# Patient Record
Sex: MALE | Race: WHITE | NOT HISPANIC OR LATINO | Employment: UNEMPLOYED | URBAN - METROPOLITAN AREA
[De-identification: names, ages, dates, MRNs, and addresses within clinical notes are randomized per-mention and may not be internally consistent; named-entity substitution may affect disease eponyms.]

---

## 2019-04-26 ENCOUNTER — OFFICE VISIT (OUTPATIENT)
Dept: NEUROLOGY | Facility: CLINIC | Age: 29
End: 2019-04-26

## 2019-04-26 VITALS
HEIGHT: 71 IN | SYSTOLIC BLOOD PRESSURE: 110 MMHG | HEART RATE: 96 BPM | DIASTOLIC BLOOD PRESSURE: 90 MMHG | BODY MASS INDEX: 33.17 KG/M2 | WEIGHT: 236.9 LBS | OXYGEN SATURATION: 98 %

## 2019-04-26 DIAGNOSIS — G40.909 NONINTRACTABLE EPILEPSY WITHOUT STATUS EPILEPTICUS, UNSPECIFIED EPILEPSY TYPE (HCC): ICD-10-CM

## 2019-04-26 DIAGNOSIS — G47.33 OBSTRUCTIVE SLEEP APNEA: ICD-10-CM

## 2019-04-26 DIAGNOSIS — G47.22 ADVANCED SLEEP PHASE SYNDROME: Primary | ICD-10-CM

## 2019-04-26 PROCEDURE — 99204 OFFICE O/P NEW MOD 45 MIN: CPT | Performed by: PSYCHIATRY & NEUROLOGY

## 2019-04-26 RX ORDER — OLANZAPINE 20 MG/1
20 TABLET ORAL NIGHTLY
COMMUNITY
End: 2021-09-29

## 2019-04-26 RX ORDER — LAMOTRIGINE 200 MG/1
200 TABLET ORAL DAILY
COMMUNITY
End: 2021-12-06 | Stop reason: SDUPTHER

## 2019-04-26 RX ORDER — VENLAFAXINE 75 MG/1
75 TABLET ORAL DAILY
COMMUNITY
End: 2021-12-30

## 2019-04-26 NOTE — PROGRESS NOTES
Subjective:     Patient ID: Davi Kendall is a 28 y.o. male.    Mr. Kendall is a 28 year old right handed male with a h/o epilepsy and MARGA (DDD, PDD and schizoaffective disorder per mom but wanted discretion with those diagnoses with patient) who presents to the neurology clinic today as a new patient today for the evaluation of his sleep disorder.  Reports that his circadian rhythm is abnormal.  This has been going on for 2 years.  Symptoms have gotten worse over the years.  Has been doing better recently with his MARGA treatment.  Going to bed around 5-6 pm.  Can fall asleep in a few minutes.  Gets up around 3-5 am.  Goal bedtime would be 9 pm and get up around 7 am.  Getting on phone helps delay bedtime.  Coffee doesn't help at all.  Does not have a light box.  But had tried in the past.  Tried melatonin in the past.  May have been 10 mg.  Doesn't remember how he took it.  Patient doesn't work.  Lives by self.  On disability.  Does not drive.  Mom takes care of finances.  Patient is not sure if he is his own POA.  Did not want mom back with him during the clinic visit, but it is ok to talk to her if she calls.  Takes olanzipine 20 mg qhs as a mood stabilizer around 5 pm, waits until he is sleepy before he takes it.  Takes all night time meds at 5 pm.  Feels somewhat refreshed in the morning.  No naps.  Before a few years ago, typical bedtime was 9-10 pm and would wake up around 7-8 am.  Drinks 4-5 cups of coffee a day.  Does get regular exercise.  No EtOH.  Started PAP the end of February.  Helped almost immediately.  Doesn't wake up during the night to urinate.  Sleeps through the night.  No more naps.  Has nasal pillows.  It is comfortable.  No major air leak.  Not sure if he is still snoring.  No bed partner.  Goes through [employer].  Compliance from Feb to March was >90%.  Residual AHI <5.  Pressures range between 6-7.5.  Is cleaning equipment regularly.  Sleep schedule is affecting social life.  Wants to go  to meet up groups in the evening. Is bored in the morning.  PSG from 2/18/19 showed an AHI of 18 (no positional component).  SE was 83%, TST was 7H 39M, AI was 28.  PLMI was 7.        Lyons sleepiness scale:   1. Sitting and reading? 2  2. Watching TV? 3  3. Sitting inactive in a public place? 0  4. As a passenger in a car for an hour without a break? 0  5. Lying down to rest in the afternoon when able? 2  6. Sitting and talking to someone? 0  7. Sitting quietly after lunch without EtOH? 1  8. In a car while stopped for a few minutes in traffic? (doesn't drive)  Total: 8    Last seizure was November of 2017.  Seizures started when he was about 15 years old.  Has convulsions.  Longest seizure was 6 minutes.  Generally just 2-3 minutes.  Has had 20-30 seizures in his life.  Would have a few a year.  This is the longest he has been seizure free.   mg BID.  Higher doses made him sleepy and had trouble walking.  Thinks that his current dose causes tremors. Tried VPA (caused weight gain).  Last EEG was a while ago, but has been told that he has generalized epilepsy.  No warnings before seizures.  Usually occur around 5-6 pm.  Triggers for seizures include strong stimulants (coffee and tobacco), stress, dehydration, sleep deprivation, flashing lights.      Risk factors:  Childhood/febrile seizures? no  Head trauma/pathology? no  CNS infections? no  Family history of seizures? No    I reviewed Dr. Hirsch's note from 12/13/18.  He was seen for hypersomnia and epilepsy.  A repeat PSG was ordered as well as an MSLT.  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        The following portions of the patient's history were reviewed and updated as appropriate: allergies, current medications, past family history, past medical history, past social history, past surgical history and problem list.    Review of Systems   Constitutional: Positive for fatigue and unexpected weight  change. Negative for appetite change.   HENT: Negative for ear pain, sinus pressure and trouble swallowing.    Eyes: Negative for pain, redness and visual disturbance.   Respiratory: Positive for apnea. Negative for shortness of breath and wheezing.    Cardiovascular: Negative for chest pain, palpitations and leg swelling.   Gastrointestinal: Negative for diarrhea, nausea and vomiting.   Endocrine: Negative for cold intolerance, heat intolerance and polyphagia.   Genitourinary: Negative for difficulty urinating, frequency and urgency.   Musculoskeletal: Negative for back pain, neck pain and neck stiffness.   Neurological: Positive for tremors and seizures. Negative for dizziness, syncope, facial asymmetry, speech difficulty, weakness, light-headedness, numbness and headaches.   Hematological: Does not bruise/bleed easily.   Psychiatric/Behavioral: Positive for decreased concentration. Negative for agitation, behavioral problems, confusion, dysphoric mood, hallucinations, self-injury, sleep disturbance and suicidal ideas. The patient is nervous/anxious. The patient is not hyperactive.     I reviewed the ROS documented by the MA.      Objective:    Neurologic Exam    Physical Exam   Constitutional:  Vital signs reviewed.  No apparent distress.  Well groomed.  Eyes:  No injection, no icterus.  Fundoscopic exam performed.  No papilledema appreciated bilaterally.   Respiratory:  Normal effort.  Clear to auscultation bilaterally.  Cardiovascular:  Regular rate and rhythm.  No murmurs.  No carotid bruits. Symmetric radial pulses.  Musculoskeletal: Normal station.  Gait steady.  Normal arm swing.  Patient able to walk on heels and toes.  Tandem gait intact.  Romberg negative.  Muscle tone and bulk normal.  Strength is 5/5 in the bilateral upper and lower extremities proximally and distally unless otherwise specified in the neurological exam.  Skin:  No rashes.  Warm, dry, and intact.  Psychiatric:  Good mood.  Normal  affect.    Neurologic:  Mental status-  The patient is alert and oriented to person, place and time (one day off on date). Attention/concentration is within normal limits.  Speech is fluent without dysarthria.  The patient is able to name, repeat and follow complex commands without difficulty.  Immediate memory and delayed recall intact (3/3 words immediate and after 4 minutes).  Fund of knowledge normal.  Cranial nerves- Pupils equally round and reactive to light with intact accomodation.  Visual acuity and visual fields intact.  Extraocular movements intact.  Facial sensation intact.  Smile symmetric.  Hearing intact to finger-rub bilaterally.  Palate elevates symmetrically.  SCM and trapezius are 5/5 bilaterally.  Tongue is midline.  Motor-  See musculoskeletal above.  No tremor.  Reflexes- 2+ in the bilateral triceps, biceps, brachioradialis, patellar and achilles.  Toes down-going bilaterally.  Sensation- Intact to pinprick and vibration in bilateral upper and lower extremities symmetrically.  Coordination- Intact to finger to nose and heel knee shin bilaterally.  Intact rapid alternating movements bilaterally.  Gait- See musculoskeletal exam above.     Assessment/Plan:  Mr. Kendall is a 28 year old male with a h/o epilepsy and moderate MARGA on autoPAP who presents today for the evaluation of advanced sleep phase.    1. Advanced sleep phase- Most likely diagnosis based on clinical presentation.  Will hold off on actigraphy for now.  Recommend light box therapy at night.  If using 7509-7030 lux, needs  minutes, if using 14535 lux, then 15-30 minutes.  Sit 18-24 inches away.  We also discussed the visors, but he was not particularly interested in those.  Will f/u in about a month.  If he is still having difficulty, may consider referral to Dr. Garcia for CBT.      2.  Epilepsy- May be structural based on his most recent MRI brain with focal onset seizures, but was told that he has primary generalized  epilepsy.  Is on a broad spectrum medication with LTG with no seizure and no side effects.  Recommend continuing current dose with no changes.  Consider PRN nasal midazolam as rescue in the future if seizures recur.      3.  Moderate MARGA on autoPAP- Continue with treatment.  Has noticed benefit.  Compliance looks great.  Weight loss long term may be beneficial.     Problems Addressed this Visit     None      Visit Diagnoses     Advanced sleep phase syndrome    -  Primary

## 2019-04-26 NOTE — PATIENT INSTRUCTIONS
Cleaning:    Masks and Nasal Pillows:  Wash once a day after use in the morning.  Use warm water and detergent with no lotion (Sahara, Maximo, etc) and rinse in warm water.  Allow to air dry.    Tubing:  Wash once a week with warm water and detergent with no lotion OR 1 part vinegar to 3 parts water.  Soak both inside and outside of the tubing and rinse in warm water.  Allow to air dry by hanging over shower zheng.    Headgear:  Can machine wash on gentle cycle with cold water or hand wash in mild detergent and rinse in cold water.  Allow to air dry.  Do not take apart (may be hard to get back together!)    Humidifier chamber:  Empty daily and add 1 cup of fresh distilled or boiled water.  Wash weekly in warm soapy water or vinegar solution and rinse in warm water.    Changing of supplies:    Every 2 weeks:  New nasal cushion or pillows; change filter    Monthly:  New full face cushion    Every 3 months:  New mask frame, new tubing    Every 6 months:  New headgear, new humidifier chamber, new chinstrap    At night, only use the bed and bedroom for sleep only. Do not read, watch TV, eat, or worry in bed.   Lie down only when you are sleepy.   If you are unable to fall asleep, get up and go to another room. Avoid stimulating activities if you do get up.   No clocks (if you tend to look at the clock throughout the night) or TV (or other electronic screens) in the bedroom.   Avoid screens (TV, computer, tablet, cell phone) the hour prior to bedtime and during your sleep period.   Establish a regular bedtime routine and a regular sleep/wake schedule. Keep this schedule 7 days a week.   Do not eat or drink close to bedtime.   Make sure your bedroom is dark, cool, and comfortable.   If you need background noise, use a fan or a white noise machine.   Avoid caffeine (regular coffee, decaf coffee, tea, sodas, chocolate, energy drinks).   Avoid alcohol and nicotine close to bedtime.   Exercise during the day, but not within 3 hours  of bedtime.   Avoid naps.   Check if any of your night time medications may cause sleep problems.   If you have heart burn or indigestion at night: avoid eating close to bedtime; elevated your head of bed; avoid spicy foods, tomatoes, citrus, alcohol, caffeine, and mint at night; take your antacid at night; sleep on your left side.   If you have nasal stuffiness or congestion: consider taking an over the counter allergy medicine such as zyrtec, claritin, or allegra at night as well as a nasal spray such as Flonase or Nasacort.     Try light therapy again.  If using 2,500-5,000 lux box, then sit 18-24 inches away for  minutes.  If using 10,000 lux box, only need to sit for 15-30 minutes.  If no benefit, then may consider cognitive behavioral therapy with Dr. Noel Garcia.

## 2019-06-14 ENCOUNTER — OFFICE VISIT (OUTPATIENT)
Dept: NEUROLOGY | Facility: CLINIC | Age: 29
End: 2019-06-14

## 2019-06-14 VITALS
HEART RATE: 85 BPM | WEIGHT: 241 LBS | HEIGHT: 71 IN | OXYGEN SATURATION: 99 % | DIASTOLIC BLOOD PRESSURE: 80 MMHG | SYSTOLIC BLOOD PRESSURE: 110 MMHG | BODY MASS INDEX: 33.74 KG/M2

## 2019-06-14 DIAGNOSIS — G47.22 ADVANCED SLEEP PHASE SYNDROME: Primary | ICD-10-CM

## 2019-06-14 PROCEDURE — 99214 OFFICE O/P EST MOD 30 MIN: CPT | Performed by: PSYCHIATRY & NEUROLOGY

## 2019-06-14 NOTE — PROGRESS NOTES
Subjective:     Patient ID: Davi Kendall is a 29 y.o. male.    Mr. Kendall is a 29 year old right handed male with a h/o epilepsy and MARGA (DDD, PDD, and schizoaffective disorder per mom) who presents to the neurology clinic today as an established patient for f/u.  He has preferred to conduct his clinic visits by himself; however, his mother has been available in the waiting room.  She was brought back for part of the visit which was ok'ed by the patient to help answer questions.  At his last visit, he was describing symptoms c/w advanced sleep phase.  He goes to bed around 5 pm and gets up around 3 am.  He would rather go to bed around 9 and get up at 7 so that he can socialize.  He has moderate MARGA on autoPAP which has improved his symptoms.  He's also had seizures since age 15, but his last seizure was Nov of 2017 on LTG monotherapy.  Since I last saw him, he states that his symptoms have been about the same.  He has been sitting in front of a light box for 20 minutes around 4 pm.  He stated that he was doing this every day for a few week; however mom thinks that he only tried it for a couple of days.  He does it in the kitchen and has not found any benefit.  If he is on his phone, he can prolong his bedtime to 7 pm.  He is still using his PAP every night and cleaning it regularly.  No further seizures since his last visit.        The following portions of the patient's history were reviewed and updated as appropriate: allergies, current medications, past family history, past medical history, past social history, past surgical history and problem list.    Review of Systems   Constitutional: Negative for activity change, appetite change and fatigue.   HENT: Negative for congestion, sinus pressure and trouble swallowing.    Eyes: Negative for pain, redness and visual disturbance.   Respiratory: Negative for chest tightness, shortness of breath and wheezing.    Cardiovascular: Negative for chest pain,  palpitations and leg swelling.   Gastrointestinal: Negative for diarrhea, nausea and vomiting.   Endocrine: Negative for cold intolerance, heat intolerance and polyphagia.   Genitourinary: Negative for difficulty urinating, frequency and urgency.   Musculoskeletal: Negative for back pain, gait problem and neck pain.   Neurological: Negative for dizziness, tremors, seizures, syncope, facial asymmetry, speech difficulty, weakness, light-headedness, numbness and headaches.   Hematological: Does not bruise/bleed easily.   Psychiatric/Behavioral: Negative for agitation, behavioral problems, confusion, decreased concentration, dysphoric mood, hallucinations, self-injury, sleep disturbance and suicidal ideas. The patient is not nervous/anxious and is not hyperactive.     I reviewed the ROS documented by the MA.      Objective:    Neurologic Exam    Physical Exam    Assessment/Plan:  Mr. Kendall is a 29 year old right handed male who presents today for f/u.    1.  Advanced sleep phase- Recommend trying to use the light later in the evening.  Will also get actigraphy to establish dx of advanced sleep phase.  If that is indeed what is going on, then may consider referral to Dr. Garcia for CBT.      2.  Epilepsy- Continue LTG at current dose with no changes.    3.  Moderate MARGA on autoPAP- Continue PAP.    A total of 25 minutes of face to face time was spent with the patient and >50% of that time was spent on counseling regarding their symptoms an plan of care.       Problems Addressed this Visit     None      Visit Diagnoses     Advanced sleep phase syndrome    -  Primary    Relevant Orders    Antigraphy Monitoring

## 2019-06-28 ENCOUNTER — DOCUMENTATION (OUTPATIENT)
Dept: SLEEP MEDICINE | Facility: HOSPITAL | Age: 29
End: 2019-06-28

## 2019-06-28 ENCOUNTER — HOSPITAL ENCOUNTER (OUTPATIENT)
Dept: SLEEP MEDICINE | Facility: HOSPITAL | Age: 29
Discharge: HOME | End: 2019-06-28
Admitting: PSYCHIATRY & NEUROLOGY

## 2019-06-28 DIAGNOSIS — G47.22 ADVANCED SLEEP PHASE SYNDROME: ICD-10-CM

## 2019-06-28 PROCEDURE — 95803 ACTIGRAPHY TESTING: CPT

## 2019-06-28 PROCEDURE — G0463 HOSPITAL OUTPT CLINIC VISIT: HCPCS

## 2019-06-28 NOTE — PROGRESS NOTES
Patient came into sleep center today alert and orient x3 to  Actiwatch Actigraphy device. Patient was educated on the care and use of the device. Patient was instructed to keep device on entire time until returned. Patient informed to only take device off if going to be in water longer then 30 minutes.  Patient was given the actiwatch wearer guide and shown how to fill out sleep documentation in the guide. Patient is to return the device on 7/15/19 no later then 1430.

## 2019-07-05 ENCOUNTER — DOCUMENTATION (OUTPATIENT)
Dept: SLEEP MEDICINE | Facility: HOSPITAL | Age: 29
End: 2019-07-05

## 2019-07-05 NOTE — PROGRESS NOTES
Patients mother came into sleep center today to return actigraphy device. Patients mother was not clear as to why her son would need to be wearing this device given his other issues he has. She was more concerned about having his meds looked and and maybe working with a psychologist. After speaking with patient and going over what could be done with the data from the Actigraphy watch patients mother felt more comfortable with trying to get he son to wear the device. Tech also informed given his tendencies to feel angst to not worry about feeling out the paper work given if would help. Patients mother took the watch back and stated she would talk her son into wearing it again. MAB

## 2019-10-14 ENCOUNTER — TELEPHONE (OUTPATIENT)
Dept: NEUROLOGY | Facility: CLINIC | Age: 29
End: 2019-10-14

## 2019-10-14 DIAGNOSIS — G47.22 ADVANCED SLEEP PHASE SYNDROME: Primary | ICD-10-CM

## 2019-10-14 NOTE — TELEPHONE ENCOUNTER
Patients mother called in and stated her son is a little hard to manage there fore he cancelled the sleep study and appointment via phone reminder and she was calling to have the sleep study order reentered so that patient could have that now, she also asked that we call her number going forward.

## 2019-10-23 ENCOUNTER — HOSPITAL ENCOUNTER (OUTPATIENT)
Dept: SLEEP MEDICINE | Facility: HOSPITAL | Age: 29
Discharge: HOME | End: 2019-10-23

## 2019-10-23 DIAGNOSIS — G47.22 ADVANCED SLEEP PHASE SYNDROME: ICD-10-CM

## 2020-05-29 ENCOUNTER — TELEPHONE (OUTPATIENT)
Dept: NEUROLOGY | Facility: CLINIC | Age: 30
End: 2020-05-29

## 2020-05-29 ENCOUNTER — OFFICE VISIT (OUTPATIENT)
Dept: NEUROLOGY | Facility: CLINIC | Age: 30
End: 2020-05-29

## 2020-05-29 VITALS
OXYGEN SATURATION: 97 % | DIASTOLIC BLOOD PRESSURE: 88 MMHG | BODY MASS INDEX: 35.14 KG/M2 | HEIGHT: 71 IN | HEART RATE: 111 BPM | WEIGHT: 251 LBS | SYSTOLIC BLOOD PRESSURE: 118 MMHG

## 2020-05-29 DIAGNOSIS — G40.909 NONINTRACTABLE EPILEPSY WITHOUT STATUS EPILEPTICUS, UNSPECIFIED EPILEPSY TYPE (HCC): Primary | ICD-10-CM

## 2020-05-29 DIAGNOSIS — G47.22 ADVANCED SLEEP PHASE SYNDROME: ICD-10-CM

## 2020-05-29 DIAGNOSIS — G47.33 OBSTRUCTIVE SLEEP APNEA: ICD-10-CM

## 2020-05-29 PROCEDURE — 99213 OFFICE O/P EST LOW 20 MIN: CPT | Performed by: PSYCHIATRY & NEUROLOGY

## 2020-05-29 NOTE — PROGRESS NOTES
Subjective:     Patient ID: Davi Kendall is a 30 y.o. male.    The patient is a 30-year-old right-handed male with a history of epilepsy and sleep apnea who presents to the neurology clinic today as an established patient for follow-up for seizures and sleep apnea.  The patient was last seen on June 14, 2019.  In the past, we had been concerned about advanced sleep phase.  We did do actigraphy that did not particularly support that.  In the past he would go to bed around 5 PM but if you would get on his phone he could prolong that bedtime to 7.  Then he would get up around 3 AM.  His goal would be to go to bed around 9 PM and get up around 7 AM.  The patient feels like his symptoms are worse.  He gets uncontrollably sleepy about 4 PM.  If he will get on his phone he can extend that to 6 and he will get up around 2 AM.  He feels refreshed and okay when he gets up and is not particularly sleepy during the daytime.  He is no longer using his light box.  He is using his CPAP with no issues.  He is not sure if he still snoring.  His weight at his last visit was 241 and today it is 251.  He uses nasal pillows.  There have not been any medication changes in his last visit.  He denies any seizures.  He continues on lamotrigine 200 mg twice a day.  He denies any side effects.  He takes it at 4 AM and 4 PM.  Higher doses made him sleepy.  The patient does not drive.  He does not nap.      The following portions of the patient's history were reviewed and updated as appropriate: allergies, current medications, past family history, past medical history, past social history, past surgical history and problem list.    Review of Systems     Objective:    Neurologic Exam    Physical Exam    Assessment/Plan:       Problems Addressed this Visit     None      Visit Diagnoses     Nonintractable epilepsy without status epilepticus, unspecified epilepsy type (CMS/HCC)    -  Primary    Obstructive sleep apnea        Advanced sleep phase  syndrome               The patient is a 30-year-old right-handed male with history of epilepsy and sleep apnea who presents today for follow-up.    1.  Epilepsy-Fortunately the patient remains seizure-free without side effects to his medication.  I recommend continuing on lamotrigine 200 mg twice a day with no changes.    2.  Sleep apnea-Overall doing well with no issues.  We will try to request a download from [employer].    3.  Advanced sleep phase-We will send referral to see Dr. Garcia.  The patient would like for appointments to be managed by his mother Georgia.  Her number is the main number listed in the demographics.    A total of 15 minutes of face-to-face time was spent with the patient and greater than 50% time was spent on counseling regarding his symptoms and plan of care.

## 2020-05-29 NOTE — PATIENT INSTRUCTIONS
[facility]  Pati Marie MD  Neurology clinic  [phone]    With anti-seizure medications, you may initially notice side effects of fatigue, drowsiness, unsteadiness, and dizziness.  Other possible side effects include nausea, abdominal pain, headache, blurry or double vision, slurred speech and mood changes.  Generally, patients will noticed these symptoms when the medication is first started or with higher doses and will go away with time.    It is import to consistently take your medication every day.  Missing just one dose may put you at risk for a breakthrough seizure.  Consider using reminders on your phone or a pill box.    If you develop a rash, please call the neurology clinic immediately or notify another healthcare professional, as this may be potentially life-threatening.  If you are unable to reach a healthcare professional, go to the emergency room immediately for further evaluation.    If you develop thoughts of wanting to hurt yourself or others, please call the neurology clinic immediately to notify another healthcare professional.  If you are unable to reach a healthcare professional, go to the emergency room immediately for further evaluation.    It is the [place] state law that you cannot drive within 90 days of a seizure.    You should avoid certain activities that if you were to have a seizure, you could harm yourself or others. In general, it is recommended that you avoid operating heavy machinery or power tools, swimming or taking baths by yourself (showers are ok), don't stand over open flames, don't get on high ladders or the roof.  I also recommend to avoid sleeping on your stomach.    For further information on epilepsy and resources available to patients and their families, please visit the [facility] at www.efky.org or call [phone].    **Check out the [facility]'s monthly Art Group Gathering.  They are  located at [facility], [address] [place] [address].  Call Tasia Correia at [phone] or email her at [email or web address] for the dates of future gatherings.**      **If you have having memory problems, consider HOBSCOTCH (Home-Based Self-management and Cognitive Training Changes lives).  It is an 8 week self-management program for adults with epilepsy and memory problems.  The program is free at the [facility].  Contact Taylor Garcia at [phone] or [email or web address].**              We will check with Dr. Ibarra if OK to take zyprexa and effexor together at night.  Will refer to Dr. Garcia for sleep problems.

## 2020-06-01 ENCOUNTER — TELEPHONE (OUTPATIENT)
Dept: NEUROLOGY | Facility: CLINIC | Age: 30
End: 2020-06-01

## 2020-06-01 ENCOUNTER — DOCUMENTATION (OUTPATIENT)
Dept: NEUROLOGY | Facility: CLINIC | Age: 30
End: 2020-06-01

## 2020-06-01 NOTE — TELEPHONE ENCOUNTER
----- Message from Pati Marie MD sent at 6/1/2020 10:39 AM EDT -----  Regarding: PCP f/u  Can you let this patient know that I spoke with his PCP and he was ok with taking both the olanzapine and venlafaxine at night to see if that may help with his sleep disorder?  Thanks!

## 2020-06-01 NOTE — PROGRESS NOTES
We received the patient's CPAP compliance download from May 2 to May 31, 2020.  His usage over 4 hours is 100%.  His mean pressure is 6.7, his peak average pressure is 11.5 and his pressure 90% of the time is 9.3.  His residual AHI is 1.6.  The patient is on AutoPap with a range of 5 to 12 cm of water.

## 2020-06-01 NOTE — TELEPHONE ENCOUNTER
I spoke with someone at [facility] and they was able to send a Email to the CPAP department to request the compliance report be faxed over.

## 2021-01-05 ENCOUNTER — TELEPHONE (OUTPATIENT)
Dept: INTERNAL MEDICINE | Facility: CLINIC | Age: 31
End: 2021-01-05

## 2021-01-05 NOTE — TELEPHONE ENCOUNTER
Patients mother is wanting to confirm with Dr. Ibarra that her son's BMI is 30 and that qualifies him to be in phase 1c of getting the covid vaccine.

## 2021-01-06 NOTE — TELEPHONE ENCOUNTER
I s/w the patient's mother, Georgia, and she requested I mail the letter to their house.  I set it up front to be mailed.

## 2021-04-16 ENCOUNTER — TELEPHONE (OUTPATIENT)
Dept: INTERNAL MEDICINE | Facility: CLINIC | Age: 31
End: 2021-04-16

## 2021-04-16 NOTE — TELEPHONE ENCOUNTER
" Please see below:  Just wanted to make you aware.    PATIENTS MOTHER CALLED IN STATING PATIENT HAS HAD AN EARACHE FOR 3 DAYS AND WANTED TO HAVE HIM SEEN TODAY. AFTER CONFIRMING  WITH OFFICE THAT THERE WERE NO APPOINTMENTS TODAY, I ADVISED HER THERE WAS NO AVAILABILITY AND SHE COULD HAVE HIM TREATED AT THE ER, WHILE IN THE PROCESS OF ATTEMPTING TO ASK IF I COULD GET HER TRANSFERRED TO THE OFFICE FOR THEM TO TELL HER SHE COULD GO TO AN URGENT CARE, SHE STATED \" SO YOU ALL DO NOT RESERVE APPOINTMENTS FOR PATIENTS WITH MEDICAL ISSUES?  I EXPLAINED WE DO OFFER SAME DAY APPOINTMENTS BUT THOSE DO NOT ALWAYS LAST LONG. SHE THEN STATED SO YOU ALL WANT ME TO GO TO SOME GERI IN THE BOX PLACE AND HUNG UP THE PHONE, SO I AM UNABLE TO CONFIRM IF SHE HAD THE PATIENT TREATED OR NOT.      "

## 2021-04-16 NOTE — TELEPHONE ENCOUNTER
"  Caller: SHIV ONEIL    Relationship to patient: Mother    Best call back number: [phone] (M)    Patient is needing: PATIENTS MOTHER CALLED IN STATING PATIENT HAS HAD AN EARACHE FOR 3 DAYS AND WANTED TO HAVE HIM SEEN TODAY. AFTER CONFIRMING  WITH OFFICE THAT THERE WERE NO APPOINTMENTS TODAY, I ADVISED HER THERE WAS NO AVAILABILITY AND SHE COULD HAVE HIM TREATED AT THE ER, WHILE IN THE PROCESS OF ATTEMPTING TO ASK IF I COULD GET HER TRANSFERRED TO THE OFFICE FOR THEM TO TELL HER SHE COULD GO TO AN URGENT CARE, SHE STATED \" SO YOU ALL DO NOT RESERVE APPOINTMENTS FOR PATIENTS WITH MEDICAL ISSUES?  I EXPLAINED WE DO OFFER SAME DAY APPOINTMENTS BUT THOSE DO NOT ALWAYS LAST LONG. SHE THEN STATED SO YOU ALL WANT ME TO GO TO SOME GERI IN THE BOX PLACE AND HUNG UP THE PHONE, SO I AM UNABLE TO CONFIRM IF SHE HAD THE PATIENT TREATED OR NOT.         "

## 2021-09-28 NOTE — TELEPHONE ENCOUNTER
Rx Refill Note  Requested Prescriptions     Pending Prescriptions Disp Refills   • venlafaxine XR (EFFEXOR-XR) 75 MG 24 hr capsule [Pharmacy Med Name: VENLAFAXINE ER 75MG CAPSULES] 90 capsule      Sig: TAKE ONE CAPSULE BY MOUTH EVERY MORNING   • OLANZapine (zyPREXA) 20 MG tablet [Pharmacy Med Name: OLANZAPINE 20MG TABLETS] 90 tablet      Sig: TAKE 1 TABLET BY MOUTH EVERY NIGHT AT BEDTIME      Last office visit with prescribing clinician: Visit date not found      Next office visit with prescribing clinician: Visit date not found            Michelle Brown MA  09/28/21, 10:43 EDT

## 2021-09-29 RX ORDER — OLANZAPINE 20 MG/1
TABLET ORAL
Qty: 90 TABLET | Refills: 2 | Status: SHIPPED | OUTPATIENT
Start: 2021-09-29 | End: 2021-12-30 | Stop reason: SDUPTHER

## 2021-09-29 RX ORDER — VENLAFAXINE HYDROCHLORIDE 75 MG/1
CAPSULE, EXTENDED RELEASE ORAL
Qty: 90 CAPSULE | Refills: 2 | Status: SHIPPED | OUTPATIENT
Start: 2021-09-29 | End: 2021-12-30

## 2021-09-30 ENCOUNTER — TELEPHONE (OUTPATIENT)
Dept: INTERNAL MEDICINE | Facility: CLINIC | Age: 31
End: 2021-09-30

## 2021-09-30 NOTE — TELEPHONE ENCOUNTER
Hub staff attempted to follow warm transfer process and was unsuccessful     Caller: SHIV ONEIL    Relationship to patient: Mother    Best call back number: [phone]    Patient is needing:NEEDING TO MAKE APPOINTMENT FOR RASH ON HAND   ANYTIME Monday WOULD WORK

## 2021-12-06 RX ORDER — LAMOTRIGINE 200 MG/1
200 TABLET ORAL DAILY
Qty: 30 TABLET | Refills: 1 | Status: SHIPPED | OUTPATIENT
Start: 2021-12-06 | End: 2021-12-07 | Stop reason: SDUPTHER

## 2021-12-06 NOTE — TELEPHONE ENCOUNTER
PATIENT'S MOTHER IS CALLING BACK TO STATE [facility] TOLD HER THAT THEY HAVE ALREADY REQUESTED THE REFILL OF THE MEDICATION ON 12/02/21.  SHE STATES HE HAS ONLY 5 DAYS OF MEDICATION REMAINING.  SHE ALSO STATES IT NEEDS TO BE BRAND NAME ONLY     lamoTRIgine (LaMICtal) 200 MG tablet     [id number] - [place], [place] - [facility] AT [place] [address] - [phone]  - [phone]   [phone]

## 2021-12-06 NOTE — TELEPHONE ENCOUNTER
Caller: SHIV ONEIL    Relationship: Mother    Best call back number: [phone]    Requested Prescriptions:   Requested Prescriptions     Pending Prescriptions Disp Refills   • lamoTRIgine (LaMICtal) 200 MG tablet       Sig: Take 1 tablet by mouth Daily.        Pharmacy where request should be sent: [id number] - [place], [place] - [facility] AT [place] [address] - [phone]  - [phone] FX     Additional details provided by patient: MEDS NEED TO BE THE NAME BRAND.     PLEASE ADVISE IF PATIENT NEEDS AN APPOINTMENT TO GET THIS REFILLED, PATIENT HAS FIVE DAYS LEFT.     Does the patient have less than a 3 day supply:  [] Yes  [x] No    Ammon Hernandez Rep   12/06/21 09:11 EST

## 2021-12-07 RX ORDER — LAMOTRIGINE 200 MG/1
200 TABLET ORAL DAILY
Qty: 30 TABLET | Refills: 1 | Status: SHIPPED | OUTPATIENT
Start: 2021-12-07 | End: 2021-12-30

## 2021-12-07 RX ORDER — LAMOTRIGINE 200 MG/1
200 TABLET ORAL DAILY
Qty: 30 TABLET | Refills: 1 | Status: CANCELLED | OUTPATIENT
Start: 2021-12-07

## 2021-12-07 NOTE — TELEPHONE ENCOUNTER
Mom called asking if we can send the lamictal again and add dispense as written so insurance will cover

## 2021-12-07 NOTE — TELEPHONE ENCOUNTER
Caller: THAD [place]    Relationship:     Best call back number: [phone] PLEASE ADVISE URBAN    Requested Prescriptions:   Requested Prescriptions     Pending Prescriptions Disp Refills   • lamoTRIgine (LaMICtal) 200 MG tablet 30 tablet 1     Sig: Take 1 tablet by mouth Daily.     Signed Prescriptions Disp Refills   • lamoTRIgine (LaMICtal) 200 MG tablet 30 tablet 1     Sig: Take 1 tablet by mouth Daily.     Authorizing Provider: [facility]        Pharmacy where request should be sent: [facility] - [place], [place] - [address] AT [address] TO REGISTERED [facility] [address] - [phone]  - [phone]      Additional details provided by patient: PRIOR AUTH NEEDED FOR NEW YEAR TERM  ORIGINAL  2021.  PATIENT HAS 3 DAYS LEFT OF MEDICATIONS.    PATIENTS MOTHER STATED THIS NEEDS TO BE SENT ELECTRONICALLY AND MARKED URGENT SO TURN AROUND WILL BE QUICK.    PATIENT HAS BEEN TRYING TO GET THIS DONE FOR A FOUR DAY PERIOD AND REALLY NEEDS THIS DONE TODAY.    Does the patient have less than a 3 day supply:  [x] Yes  [] No    Ammon Archibald Rep   21 14:15 EST

## 2021-12-07 NOTE — TELEPHONE ENCOUNTER
Looks like he really has not been seen in a long time.  He no showed in October.  Probably needs to be seen I would think

## 2021-12-30 ENCOUNTER — OFFICE VISIT (OUTPATIENT)
Dept: INTERNAL MEDICINE | Facility: CLINIC | Age: 31
End: 2021-12-30

## 2021-12-30 VITALS
SYSTOLIC BLOOD PRESSURE: 134 MMHG | BODY MASS INDEX: 35.49 KG/M2 | DIASTOLIC BLOOD PRESSURE: 88 MMHG | HEART RATE: 89 BPM | WEIGHT: 262 LBS | RESPIRATION RATE: 16 BRPM | OXYGEN SATURATION: 97 % | TEMPERATURE: 97.7 F | HEIGHT: 72 IN

## 2021-12-30 DIAGNOSIS — R73.9 HYPERGLYCEMIA: ICD-10-CM

## 2021-12-30 DIAGNOSIS — E66.8 OTHER OBESITY: ICD-10-CM

## 2021-12-30 DIAGNOSIS — G40.909 SEIZURE DISORDER (HCC): Chronic | ICD-10-CM

## 2021-12-30 DIAGNOSIS — F41.9 ANXIETY AND DEPRESSION: Primary | ICD-10-CM

## 2021-12-30 DIAGNOSIS — F32.A ANXIETY AND DEPRESSION: Primary | ICD-10-CM

## 2021-12-30 PROCEDURE — 99214 OFFICE O/P EST MOD 30 MIN: CPT | Performed by: INTERNAL MEDICINE

## 2021-12-30 RX ORDER — LAMOTRIGINE 200 MG/1
200 TABLET ORAL 2 TIMES DAILY
Qty: 180 TABLET | Refills: 1 | Status: SHIPPED | OUTPATIENT
Start: 2021-12-30 | End: 2022-07-08 | Stop reason: SDUPTHER

## 2021-12-30 RX ORDER — VENLAFAXINE HYDROCHLORIDE 75 MG/1
75 CAPSULE, EXTENDED RELEASE ORAL EVERY MORNING
Qty: 90 CAPSULE | Refills: 1 | Status: SHIPPED | OUTPATIENT
Start: 2021-12-30 | End: 2022-08-16 | Stop reason: SDUPTHER

## 2021-12-30 RX ORDER — OLANZAPINE 20 MG/1
20 TABLET ORAL
Qty: 90 TABLET | Refills: 1 | Status: SHIPPED | OUTPATIENT
Start: 2021-12-30 | End: 2022-08-16 | Stop reason: SDUPTHER

## 2021-12-30 NOTE — ASSESSMENT & PLAN NOTE
CONTROLLED  - cont on current medications with SNRI + atypical antipsychotic--> refills sent today  - Reviewed potential side effects of medication including sexual performance changes, insomnia, fatigue, weight changes, metabolic changes including HLD and DM. Pt tolerating well without any issues.   - will get labs today for drug monitoring as it has been several years

## 2021-12-30 NOTE — ASSESSMENT & PLAN NOTE
CONTROLLED  - previously seen by neurology, now managed at PCP  - last seizure was 2017, stable on meds  - monitoring labs on lamictal ordered today--> Refills sent

## 2021-12-30 NOTE — PROGRESS NOTES
"Chief Complaint  Follow-up, Med Refill, Anxiety, and Depression    Subjective          Davi Kendall presents to [facility] for follow up and med refills. Pt taking all medications daily as prescribed with good reported compliance. No issues or side effects with meds.   Doing well on all meds from a mood standpoint, no need for dose titration.   Last seizure was 2017, stable on meds.       Objective   Vital Signs:     /88   Pulse 89   Temp 97.7 °F (36.5 °C)   Resp 16   Ht 182.9 cm (72\")   Wt 119 kg (262 lb)   SpO2 97%   BMI 35.53 kg/m²     Physical Exam  Vitals and nursing note reviewed.   Constitutional:       General: He is not in acute distress.     Appearance: Normal appearance.   Cardiovascular:      Rate and Rhythm: Normal rate and regular rhythm.      Pulses: Normal pulses.      Heart sounds: Normal heart sounds. No murmur heard.      Pulmonary:      Effort: Pulmonary effort is normal. No respiratory distress.      Breath sounds: Normal breath sounds.   Abdominal:      General: Abdomen is flat. Bowel sounds are normal.      Palpations: Abdomen is soft.      Tenderness: There is no abdominal tenderness.   Musculoskeletal:      Right lower leg: No edema.      Left lower leg: No edema.   Neurological:      Mental Status: He is alert and oriented to person, place, and time. Mental status is at baseline.   Psychiatric:         Mood and Affect: Mood normal.         Behavior: Behavior normal.          Result Review :: None        Assessment and Plan      Diagnoses and all orders for this visit:    1. Anxiety and depression (Primary)  Assessment & Plan:  CONTROLLED  - cont on current medications with SNRI + atypical antipsychotic--> refills sent today  - Reviewed potential side effects of medication including sexual performance changes, insomnia, fatigue, weight changes, metabolic changes including HLD and DM. Pt tolerating well without any issues.   - " will get labs today for drug monitoring as it has been several years    Orders:  -     LaMICtal 200 MG tablet; Take 1 tablet by mouth 2 (Two) Times a Day. Do not substitute  Dispense: 180 tablet; Refill: 1  -     OLANZapine (zyPREXA) 20 MG tablet; Take 1 tablet by mouth every night at bedtime.  Dispense: 90 tablet; Refill: 1  -     venlafaxine XR (EFFEXOR-XR) 75 MG 24 hr capsule; Take 1 capsule by mouth Every Morning.  Dispense: 90 capsule; Refill: 1  -     Comprehensive Metabolic Panel  -     CBC & Differential  -     Lipid Panel  -     Hemoglobin A1c    2. Seizure disorder (HCC)  Assessment & Plan:  CONTROLLED  - previously seen by neurology, now managed at PCP  - last seizure was 2017, stable on meds  - monitoring labs on lamictal ordered today--> Refills sent    Orders:  -     Comprehensive Metabolic Panel  -     CBC & Differential    Follow Up   Return in about 6 months (around 6/30/2022) for Annual physical.    Patient was given instructions and counseling regarding his condition or for health maintenance advice. Please see specific information pulled into the AVS if appropriate.     Catrina Chang MD  [facility]  Phone: [phone]  Fax: [phone]

## 2021-12-31 LAB
ALBUMIN SERPL-MCNC: 5.1 G/DL (ref 4–5)
ALBUMIN/GLOB SERPL: 1.9 {RATIO} (ref 1.2–2.2)
ALP SERPL-CCNC: 76 IU/L (ref 44–121)
ALT SERPL-CCNC: 68 IU/L (ref 0–44)
AST SERPL-CCNC: 36 IU/L (ref 0–40)
BASOPHILS # BLD AUTO: 0 X10E3/UL (ref 0–0.2)
BASOPHILS NFR BLD AUTO: 0 %
BILIRUB SERPL-MCNC: 0.4 MG/DL (ref 0–1.2)
BUN SERPL-MCNC: 10 MG/DL (ref 6–20)
BUN/CREAT SERPL: 10 (ref 9–20)
CALCIUM SERPL-MCNC: 9.4 MG/DL (ref 8.7–10.2)
CHLORIDE SERPL-SCNC: 103 MMOL/L (ref 96–106)
CHOLEST SERPL-MCNC: 208 MG/DL (ref 100–199)
CO2 SERPL-SCNC: 18 MMOL/L (ref 20–29)
CREAT SERPL-MCNC: 1.03 MG/DL (ref 0.76–1.27)
EOSINOPHIL # BLD AUTO: 0 X10E3/UL (ref 0–0.4)
EOSINOPHIL NFR BLD AUTO: 0 %
ERYTHROCYTE [DISTWIDTH] IN BLOOD BY AUTOMATED COUNT: 11.9 % (ref 11.6–15.4)
GLOBULIN SER CALC-MCNC: 2.7 G/DL (ref 1.5–4.5)
GLUCOSE SERPL-MCNC: 80 MG/DL (ref 65–99)
HBA1C MFR BLD: 5.2 % (ref 4.8–5.6)
HCT VFR BLD AUTO: 48.1 % (ref 37.5–51)
HDLC SERPL-MCNC: 35 MG/DL
HGB BLD-MCNC: 16.5 G/DL (ref 13–17.7)
IMM GRANULOCYTES # BLD AUTO: 0 X10E3/UL (ref 0–0.1)
IMM GRANULOCYTES NFR BLD AUTO: 0 %
LDLC SERPL CALC-MCNC: 128 MG/DL (ref 0–99)
LYMPHOCYTES # BLD AUTO: 2.1 X10E3/UL (ref 0.7–3.1)
LYMPHOCYTES NFR BLD AUTO: 31 %
MCH RBC QN AUTO: 30.8 PG (ref 26.6–33)
MCHC RBC AUTO-ENTMCNC: 34.3 G/DL (ref 31.5–35.7)
MCV RBC AUTO: 90 FL (ref 79–97)
MONOCYTES # BLD AUTO: 0.7 X10E3/UL (ref 0.1–0.9)
MONOCYTES NFR BLD AUTO: 11 %
NEUTROPHILS # BLD AUTO: 4 X10E3/UL (ref 1.4–7)
NEUTROPHILS NFR BLD AUTO: 58 %
PLATELET # BLD AUTO: 254 X10E3/UL (ref 150–450)
POTASSIUM SERPL-SCNC: 4.2 MMOL/L (ref 3.5–5.2)
PROT SERPL-MCNC: 7.8 G/DL (ref 6–8.5)
RBC # BLD AUTO: 5.35 X10E6/UL (ref 4.14–5.8)
SODIUM SERPL-SCNC: 138 MMOL/L (ref 134–144)
TRIGL SERPL-MCNC: 253 MG/DL (ref 0–149)
VLDLC SERPL CALC-MCNC: 45 MG/DL (ref 5–40)
WBC # BLD AUTO: 6.8 X10E3/UL (ref 3.4–10.8)

## 2022-07-08 DIAGNOSIS — F41.9 ANXIETY AND DEPRESSION: ICD-10-CM

## 2022-07-08 DIAGNOSIS — F32.A ANXIETY AND DEPRESSION: ICD-10-CM

## 2022-07-08 RX ORDER — LAMOTRIGINE 200 MG/1
200 TABLET ORAL 2 TIMES DAILY
Qty: 180 TABLET | Refills: 1 | Status: SHIPPED | OUTPATIENT
Start: 2022-07-08 | End: 2022-08-16 | Stop reason: SDUPTHER

## 2022-07-08 NOTE — TELEPHONE ENCOUNTER
MOTHER STATES THIS RX IS URGENT   PATIENT ONLY HAS TWO TABS LEFT   PLEASE APPROVE ASAP   THANK YOU  Rx Refill Note  Requested Prescriptions     Pending Prescriptions Disp Refills   • LaMICtal 200 MG tablet 180 tablet 1     Sig: Take 1 tablet by mouth 2 (Two) Times a Day. Do not substitute      Last office visit with prescribing clinician: Visit date not found      Next office visit with prescribing clinician: Visit date not found            Quincy Jauregui MA  07/08/22, 09:42 EDT

## 2022-08-16 ENCOUNTER — OFFICE VISIT (OUTPATIENT)
Dept: INTERNAL MEDICINE | Facility: CLINIC | Age: 32
End: 2022-08-16

## 2022-08-16 VITALS
DIASTOLIC BLOOD PRESSURE: 78 MMHG | HEART RATE: 80 BPM | TEMPERATURE: 98.2 F | BODY MASS INDEX: 37.38 KG/M2 | WEIGHT: 276 LBS | OXYGEN SATURATION: 99 % | HEIGHT: 72 IN | RESPIRATION RATE: 18 BRPM | SYSTOLIC BLOOD PRESSURE: 130 MMHG

## 2022-08-16 DIAGNOSIS — Z11.4 ENCOUNTER FOR SCREENING FOR HIV: ICD-10-CM

## 2022-08-16 DIAGNOSIS — G40.909 SEIZURE DISORDER: ICD-10-CM

## 2022-08-16 DIAGNOSIS — E78.2 MIXED HYPERLIPIDEMIA: ICD-10-CM

## 2022-08-16 DIAGNOSIS — R73.9 HYPERGLYCEMIA: ICD-10-CM

## 2022-08-16 DIAGNOSIS — Z13.0 SCREENING FOR DEFICIENCY ANEMIA: ICD-10-CM

## 2022-08-16 DIAGNOSIS — F41.9 ANXIETY AND DEPRESSION: ICD-10-CM

## 2022-08-16 DIAGNOSIS — Z11.59 ENCOUNTER FOR HCV SCREENING TEST FOR LOW RISK PATIENT: ICD-10-CM

## 2022-08-16 DIAGNOSIS — Z00.00 WELL ADULT EXAM: Primary | ICD-10-CM

## 2022-08-16 DIAGNOSIS — F32.A ANXIETY AND DEPRESSION: ICD-10-CM

## 2022-08-16 DIAGNOSIS — R73.01 ELEVATED FASTING GLUCOSE: ICD-10-CM

## 2022-08-16 PROCEDURE — G0439 PPPS, SUBSEQ VISIT: HCPCS | Performed by: INTERNAL MEDICINE

## 2022-08-16 PROCEDURE — 1170F FXNL STATUS ASSESSED: CPT | Performed by: INTERNAL MEDICINE

## 2022-08-16 PROCEDURE — 1160F RVW MEDS BY RX/DR IN RCRD: CPT | Performed by: INTERNAL MEDICINE

## 2022-08-16 RX ORDER — LAMOTRIGINE 200 MG/1
200 TABLET ORAL 2 TIMES DAILY
Qty: 180 TABLET | Refills: 2 | Status: SHIPPED | OUTPATIENT
Start: 2022-08-16 | End: 2022-12-15 | Stop reason: SDUPTHER

## 2022-08-16 RX ORDER — OLANZAPINE 20 MG/1
20 TABLET ORAL
Qty: 90 TABLET | Refills: 2 | Status: SHIPPED | OUTPATIENT
Start: 2022-08-16 | End: 2022-12-19

## 2022-08-16 RX ORDER — VENLAFAXINE HYDROCHLORIDE 75 MG/1
75 CAPSULE, EXTENDED RELEASE ORAL EVERY MORNING
Qty: 90 CAPSULE | Refills: 1 | Status: CANCELLED | OUTPATIENT
Start: 2022-08-16

## 2022-08-16 RX ORDER — VENLAFAXINE HYDROCHLORIDE 75 MG/1
75 CAPSULE, EXTENDED RELEASE ORAL EVERY MORNING
Qty: 90 CAPSULE | Refills: 2 | Status: SHIPPED | OUTPATIENT
Start: 2022-08-16 | End: 2023-02-07

## 2022-08-16 NOTE — PROGRESS NOTES
The ABCs of the Annual Wellness Visit  Subsequent Medicare Wellness Visit    Chief Complaint   Patient presents with   • Annual Exam      Subjective    History of Present Illness:  Davi Kendall is a 32 y.o. male who presents for a Subsequent Medicare Wellness Visit.    The following portions of the patient's history were reviewed and   updated as appropriate: allergies, current medications, past family history, past medical history, past social history, past surgical history and problem list.    Compared to one year ago, the patient feels his physical   health is the same.    Compared to one year ago, the patient feels his mental   health is the same.    Recent Hospitalizations:  He was not admitted to the hospital during the last year.       Current Medical Providers:  Patient Care Team:  Girish Ibarra MD as PCP - General (Internal Medicine)    Outpatient Medications Prior to Visit   Medication Sig Dispense Refill   • fluticasone (FLONASE) 50 MCG/ACT nasal spray 2 sprays into the nostril(s) as directed by provider Daily. 16 g 0   • LaMICtal 200 MG tablet Take 1 tablet by mouth 2 (Two) Times a Day. Do not substitute 180 tablet 1   • OLANZapine (zyPREXA) 20 MG tablet Take 1 tablet by mouth every night at bedtime. 90 tablet 1   • venlafaxine XR (EFFEXOR-XR) 75 MG 24 hr capsule Take 1 capsule by mouth Every Morning. 90 capsule 1   • cefdinir (OMNICEF) 300 MG capsule Take 1 capsule by mouth 2 (Two) Times a Day. 14 capsule 0     No facility-administered medications prior to visit.       No opioid medication identified on active medication list. I have reviewed chart for other potential  high risk medication/s and harmful drug interactions in the elderly.          Aspirin is not on active medication list.  Aspirin use is not indicated based on review of current medical condition/s. Risk of harm outweighs potential benefits.  .    Patient Active Problem List   Diagnosis   • Anxiety and depression   • Seizure  "disorder (HCC)     Advance Care Planning  Advance Directive is on file.  ACP discussion was held with the patient during this visit. Patient has an advance directive in EMR which is still valid.           Objective    Vitals:    08/16/22 1124   BP: 130/78   Pulse: 80   Resp: 18   Temp: 98.2 °F (36.8 °C)   SpO2: 99%   Weight: 125 kg (276 lb)   Height: 182.9 cm (72\")     Estimated body mass index is 37.43 kg/m² as calculated from the following:    Height as of this encounter: 182.9 cm (72\").    Weight as of this encounter: 125 kg (276 lb).    Class 2 Severe Obesity (BMI >=35 and <=39.9). Obesity-related health conditions include the following: none. Obesity is worsening. BMI is is above average; BMI management plan is completed. We discussed portion control and increasing exercise.      Does the patient have evidence of cognitive impairment? No    Physical Exam            HEALTH RISK ASSESSMENT    Smoking Status:  Social History     Tobacco Use   Smoking Status Former Smoker   Smokeless Tobacco Never Used     Alcohol Consumption:  Social History     Substance and Sexual Activity   Alcohol Use No     Fall Risk Screen:    STEADI Fall Risk Assessment has not been completed.    Depression Screening:  No flowsheet data found.    Health Habits and Functional and Cognitive Screening:  No flowsheet data found.    Age-appropriate Screening Schedule:  Refer to the list below for future screening recommendations based on patient's age, sex and/or medical conditions. Orders for these recommended tests are listed in the plan section. The patient has been provided with a written plan.    Health Maintenance   Topic Date Due   • INFLUENZA VACCINE  10/01/2022   • LIPID PANEL  12/30/2022   • TDAP/TD VACCINES (2 - Td or Tdap) 07/15/2023              Assessment & Plan   CMS Preventative Services Quick Reference  Risk Factors Identified During Encounter  Obesity/Overweight   Polypharmacy  The above risks/problems have been discussed with " the patient.  Follow up actions/plans if indicated are seen below in the Assessment/Plan Section.  Pertinent information has been shared with the patient in the After Visit Summary.    Diagnoses and all orders for this visit:    1. Well adult exam (Primary)  -     Comprehensive Metabolic Panel  -     Cancel: Lipid Panel  -     Cancel: Hemoglobin A1c  -     Cancel: CBC & Differential    2. Encounter for screening for HIV  -     HIV-1 / O / 2 Ag / Antibody 4th Generation    3. Encounter for HCV screening test for low risk patient  -     Hepatitis C Antibody    4. Anxiety and depression  -     OLANZapine (zyPREXA) 20 MG tablet; Take 1 tablet by mouth every night at bedtime.  Dispense: 90 tablet; Refill: 2  -     LaMICtal 200 MG tablet; Take 1 tablet by mouth 2 (Two) Times a Day. Do not substitute  Dispense: 180 tablet; Refill: 2  -     venlafaxine XR (EFFEXOR-XR) 75 MG 24 hr capsule; Take 1 capsule by mouth Every Morning.  Dispense: 90 capsule; Refill: 2    5. Elevated fasting glucose  -     Hemoglobin A1c    6. Screening for deficiency anemia  -     CBC w AUTO Differential    7. Mixed hyperlipidemia  -     Lipid panel    8. Seizure disorder (HCC)    9. Hyperglycemia        Follow Up:   No follow-ups on file.     An After Visit Summary and PPPS were made available to the patient.

## 2022-08-16 NOTE — TELEPHONE ENCOUNTER
Rx Refill Note  Requested Prescriptions     Pending Prescriptions Disp Refills   • venlafaxine XR (EFFEXOR-XR) 75 MG 24 hr capsule 90 capsule 1     Sig: Take 1 capsule by mouth Every Morning.      Last office visit with prescribing clinician: 8/16/2022      Next office visit with prescribing clinician: Visit date not found            Quincy Jauregui MA  08/16/22, 11:26 EDT

## 2022-08-16 NOTE — PROGRESS NOTES
"Chief Complaint   Patient presents with   • Annual Exam       Subjective   Davi Kendall is a 32 y.o. male.     History of Present Illness     The following portions of the patient's history were reviewed and updated as appropriate: allergies, current medications, past family history, past medical history, past social history, past surgical history, and problem list.    Review of Systems      Objective   Body mass index is 37.43 kg/m².   Vitals:    08/16/22 1124   BP: 130/78   Pulse: 80   Resp: 18   Temp: 98.2 °F (36.8 °C)   SpO2: 99%   Weight: 125 kg (276 lb)   Height: 182.9 cm (72\")        Physical Exam  Vitals and nursing note reviewed.   Constitutional:       General: He is not in acute distress.     Appearance: Normal appearance.   HENT:      Head: Normocephalic and atraumatic.      Right Ear: External ear normal.      Left Ear: External ear normal.      Nose: Nose normal.      Mouth/Throat:      Mouth: Mucous membranes are moist.      Pharynx: Oropharynx is clear.   Eyes:      Extraocular Movements: Extraocular movements intact.      Conjunctiva/sclera: Conjunctivae normal.      Pupils: Pupils are equal, round, and reactive to light.   Cardiovascular:      Rate and Rhythm: Normal rate and regular rhythm.      Pulses: Normal pulses.      Heart sounds: Normal heart sounds. No murmur heard.    No gallop.   Pulmonary:      Effort: Pulmonary effort is normal.      Breath sounds: Normal breath sounds.   Abdominal:      General: Abdomen is flat. Bowel sounds are normal. There is no distension.      Palpations: Abdomen is soft. There is no mass.      Tenderness: There is no abdominal tenderness.   Musculoskeletal:         General: No swelling. Normal range of motion.      Cervical back: Normal range of motion and neck supple.   Skin:     General: Skin is warm and dry.      Findings: No rash.   Neurological:      General: No focal deficit present.      Mental Status: He is alert and oriented to person, place, and " time. Mental status is at baseline.   Psychiatric:         Mood and Affect: Mood normal.         Behavior: Behavior normal.           Current Outpatient Medications:   •  fluticasone (FLONASE) 50 MCG/ACT nasal spray, 2 sprays into the nostril(s) as directed by provider Daily., Disp: 16 g, Rfl: 0  •  LaMICtal 200 MG tablet, Take 1 tablet by mouth 2 (Two) Times a Day. Do not substitute, Disp: 180 tablet, Rfl: 2  •  OLANZapine (zyPREXA) 20 MG tablet, Take 1 tablet by mouth every night at bedtime., Disp: 90 tablet, Rfl: 2  •  venlafaxine XR (EFFEXOR-XR) 75 MG 24 hr capsule, Take 1 capsule by mouth Every Morning., Disp: 90 capsule, Rfl: 2     Lab Results   Component Value Date    HGBA1C 5.2 12/30/2021        Health Maintenance   Topic Date Due   • INFLUENZA VACCINE  10/01/2022   • LIPID PANEL  12/30/2022   • TDAP/TD VACCINES (2 - Td or Tdap) 07/15/2023        Immunization History   Administered Date(s) Administered   • COVID-19 (PFIZER) PURPLE CAP 02/27/2021, 03/20/2021, 09/04/2021       Assessment & Plan   Diagnoses and all orders for this visit:    1. Well adult exam (Primary)  -     Comprehensive Metabolic Panel  -     Cancel: Lipid Panel  -     Cancel: Hemoglobin A1c  -     Cancel: CBC & Differential    2. Encounter for screening for HIV  -     HIV-1 / O / 2 Ag / Antibody 4th Generation    3. Encounter for HCV screening test for low risk patient  -     Hepatitis C Antibody    4. Anxiety and depression  -     OLANZapine (zyPREXA) 20 MG tablet; Take 1 tablet by mouth every night at bedtime.  Dispense: 90 tablet; Refill: 2  -     LaMICtal 200 MG tablet; Take 1 tablet by mouth 2 (Two) Times a Day. Do not substitute  Dispense: 180 tablet; Refill: 2  -     venlafaxine XR (EFFEXOR-XR) 75 MG 24 hr capsule; Take 1 capsule by mouth Every Morning.  Dispense: 90 capsule; Refill: 2    5. Elevated fasting glucose  -     Hemoglobin A1c    6. Screening for deficiency anemia  -     CBC w AUTO Differential    7. Mixed  hyperlipidemia  -     Lipid panel    8. Seizure disorder (HCC)    9. Hyperglycemia    - conitnue lamictal, olanzapine and venlafaxine, doing well without issues or side effects; keep neurology appts, check labs       Well adult exam  - labs checked and evaluated    Colonoscopy - at 45  Prostate - at 50  Glaucoma - yearly  AAA - na  Lung cancer - na  HIV - checking  HCV - checking  DM - checking  HLD - checking  Smoking - no  Depression - yes, well managed with meds  Vaccines - up to date  Falls - no  Alcohol Screening - no    Discussed mental health, sexual health, substance use, abuse, anticipatory guidance given.      No follow-ups on file.     Girish Ibarra MD  [facility] [place]  Internal Medicine and Pediatrics  Phone: [phone]  Fax: [phone]

## 2022-08-17 LAB
ALBUMIN SERPL-MCNC: 4.9 G/DL (ref 4–5)
ALBUMIN/GLOB SERPL: 1.8 {RATIO} (ref 1.2–2.2)
ALP SERPL-CCNC: 68 IU/L (ref 44–121)
ALT SERPL-CCNC: 71 IU/L (ref 0–44)
AST SERPL-CCNC: 40 IU/L (ref 0–40)
BASOPHILS # BLD AUTO: 0 X10E3/UL (ref 0–0.2)
BASOPHILS NFR BLD AUTO: 0 %
BILIRUB SERPL-MCNC: 0.4 MG/DL (ref 0–1.2)
BUN SERPL-MCNC: 9 MG/DL (ref 6–20)
BUN/CREAT SERPL: 9 (ref 9–20)
CALCIUM SERPL-MCNC: 9.4 MG/DL (ref 8.7–10.2)
CHLORIDE SERPL-SCNC: 105 MMOL/L (ref 96–106)
CHOLEST SERPL-MCNC: 197 MG/DL (ref 100–199)
CO2 SERPL-SCNC: 18 MMOL/L (ref 20–29)
CREAT SERPL-MCNC: 0.96 MG/DL (ref 0.76–1.27)
EGFRCR-CYS SERPLBLD CKD-EPI 2021: 108 ML/MIN/1.73
EOSINOPHIL # BLD AUTO: 0 X10E3/UL (ref 0–0.4)
EOSINOPHIL NFR BLD AUTO: 0 %
ERYTHROCYTE [DISTWIDTH] IN BLOOD BY AUTOMATED COUNT: 12.4 % (ref 11.6–15.4)
GLOBULIN SER CALC-MCNC: 2.7 G/DL (ref 1.5–4.5)
GLUCOSE SERPL-MCNC: 92 MG/DL (ref 65–99)
HBA1C MFR BLD: 5.3 % (ref 4.8–5.6)
HCT VFR BLD AUTO: 46.5 % (ref 37.5–51)
HCV AB S/CO SERPL IA: 0.1 S/CO RATIO (ref 0–0.9)
HDLC SERPL-MCNC: 34 MG/DL
HGB BLD-MCNC: 15.9 G/DL (ref 13–17.7)
HIV 1+2 AB+HIV1 P24 AG SERPL QL IA: NON REACTIVE
IMM GRANULOCYTES # BLD AUTO: 0 X10E3/UL (ref 0–0.1)
IMM GRANULOCYTES NFR BLD AUTO: 0 %
LDLC SERPL CALC-MCNC: 115 MG/DL (ref 0–99)
LYMPHOCYTES # BLD AUTO: 1.8 X10E3/UL (ref 0.7–3.1)
LYMPHOCYTES NFR BLD AUTO: 27 %
MCH RBC QN AUTO: 31.6 PG (ref 26.6–33)
MCHC RBC AUTO-ENTMCNC: 34.2 G/DL (ref 31.5–35.7)
MCV RBC AUTO: 92 FL (ref 79–97)
MONOCYTES # BLD AUTO: 0.7 X10E3/UL (ref 0.1–0.9)
MONOCYTES NFR BLD AUTO: 11 %
NEUTROPHILS # BLD AUTO: 4.1 X10E3/UL (ref 1.4–7)
NEUTROPHILS NFR BLD AUTO: 62 %
PLATELET # BLD AUTO: 245 X10E3/UL (ref 150–450)
POTASSIUM SERPL-SCNC: 4.2 MMOL/L (ref 3.5–5.2)
PROT SERPL-MCNC: 7.6 G/DL (ref 6–8.5)
RBC # BLD AUTO: 5.03 X10E6/UL (ref 4.14–5.8)
SODIUM SERPL-SCNC: 141 MMOL/L (ref 134–144)
TRIGL SERPL-MCNC: 276 MG/DL (ref 0–149)
VLDLC SERPL CALC-MCNC: 48 MG/DL (ref 5–40)
WBC # BLD AUTO: 6.7 X10E3/UL (ref 3.4–10.8)

## 2022-12-15 DIAGNOSIS — F32.A ANXIETY AND DEPRESSION: ICD-10-CM

## 2022-12-15 DIAGNOSIS — F41.9 ANXIETY AND DEPRESSION: ICD-10-CM

## 2022-12-15 RX ORDER — LAMOTRIGINE 200 MG/1
200 TABLET ORAL 2 TIMES DAILY
Qty: 180 TABLET | Refills: 2 | Status: SHIPPED | OUTPATIENT
Start: 2022-12-15

## 2022-12-15 NOTE — TELEPHONE ENCOUNTER
Caller: THAD, [place]    Relationship: Mother    Best call back number: [phone]    Requested Prescriptions:   Requested Prescriptions     Pending Prescriptions Disp Refills   • LaMICtal 200 MG tablet 180 tablet 2     Sig: Take 1 tablet by mouth 2 (Two) Times a Day. Do not substitute        Pharmacy where request should be sent: [facility][id number] [address] AT [place] [address] - [phone]  - [phone] FX     Additional details provided by patient:     Does the patient have less than a 3 day supply:  [] Yes  [x] No    Would you like a call back once the refill request has been completed: [x] Yes [] No    If the office needs to give you a call back, can they leave a voicemail: [x] Yes [] No    Ammon Blackburn Rep   12/15/22 08:13 EST

## 2022-12-15 NOTE — TELEPHONE ENCOUNTER
Rx Refill Note  Requested Prescriptions     Pending Prescriptions Disp Refills   • LaMICtal 200 MG tablet 180 tablet 2     Sig: Take 1 tablet by mouth 2 (Two) Times a Day. Do not substitute      Last office visit with prescribing clinician: 8/16/2022   Last telemedicine visit with prescribing clinician: 2/16/2023   Next office visit with prescribing clinician: 2/16/2023                         Would you like a call back once the refill request has been completed: [] Yes [] No    If the office needs to give you a call back, can they leave a voicemail: [] Yes [] No    Quincy Jauregui MA  12/15/22, 08:40 EST

## 2022-12-16 ENCOUNTER — TELEPHONE (OUTPATIENT)
Dept: INTERNAL MEDICINE | Facility: CLINIC | Age: 32
End: 2022-12-16

## 2022-12-16 NOTE — TELEPHONE ENCOUNTER
Caller: SHIV ONEIL    Relationship: Mother    Best call back number: [phone]    What was the call regarding: PATIENT'S MOTHER STATED THAT SHE IS HAVING TROUBLE GETTING INTO PATIENT'S MYCHART. PATIENT'S MOTHER REQUESTED A REFILL YESTERDAY OF PATIENT'S LaMICtal 200 MG tablet.  PATIENT'S MOTHER IS CALLING TO SEE IF THAT HAS BEEN APPROVED OR NOT. PLEASE ADVISE.    Do you require a callback: YES

## 2022-12-17 DIAGNOSIS — F32.A ANXIETY AND DEPRESSION: ICD-10-CM

## 2022-12-17 DIAGNOSIS — F41.9 ANXIETY AND DEPRESSION: ICD-10-CM

## 2022-12-19 ENCOUNTER — TELEPHONE (OUTPATIENT)
Dept: INTERNAL MEDICINE | Facility: CLINIC | Age: 32
End: 2022-12-19

## 2022-12-19 RX ORDER — OLANZAPINE 20 MG/1
20 TABLET ORAL
Qty: 90 TABLET | Refills: 2 | Status: SHIPPED | OUTPATIENT
Start: 2022-12-19

## 2022-12-19 NOTE — TELEPHONE ENCOUNTER
Rx Refill Note  Requested Prescriptions     Pending Prescriptions Disp Refills   • OLANZapine (zyPREXA) 20 MG tablet [Pharmacy Med Name: OLANZAPINE 20MG TABLETS] 90 tablet 2     Sig: TAKE 1 TABLET BY MOUTH EVERY NIGHT AT BEDTIME      Last office visit with prescribing clinician: 8/16/2022   Last telemedicine visit with prescribing clinician: 12/19/2022   Next office visit with prescribing clinician: 12/19/2022                         Would you like a call back once the refill request has been completed: [] Yes [] No    If the office needs to give you a call back, can they leave a voicemail: [] Yes [] No    Quincy Jauregui MA  12/19/22, 13:06 EST

## 2023-02-07 DIAGNOSIS — F41.9 ANXIETY AND DEPRESSION: ICD-10-CM

## 2023-02-07 DIAGNOSIS — F32.A ANXIETY AND DEPRESSION: ICD-10-CM

## 2023-02-07 RX ORDER — VENLAFAXINE HYDROCHLORIDE 75 MG/1
75 CAPSULE, EXTENDED RELEASE ORAL EVERY MORNING
Qty: 90 CAPSULE | Refills: 2 | Status: SHIPPED | OUTPATIENT
Start: 2023-02-07

## 2023-02-07 NOTE — TELEPHONE ENCOUNTER
Rx Refill Note  Requested Prescriptions     Pending Prescriptions Disp Refills   • venlafaxine XR (EFFEXOR-XR) 75 MG 24 hr capsule [Pharmacy Med Name: VENLAFAXINE ER 75MG CAPSULES] 90 capsule 2     Sig: TAKE 1 CAPSULE BY MOUTH EVERY MORNING      Last office visit with prescribing clinician: 8/16/2022   Last telemedicine visit with prescribing clinician: 2/16/2023   Next office visit with prescribing clinician: 2/16/2023                         Would you like a call back once the refill request has been completed: [] Yes [] No    If the office needs to give you a call back, can they leave a voicemail: [] Yes [] No    Quincy Jauregui MA  02/07/23, 08:47 EST

## 2023-02-16 ENCOUNTER — OFFICE VISIT (OUTPATIENT)
Dept: INTERNAL MEDICINE | Facility: CLINIC | Age: 33
End: 2023-02-16
Payer: MEDICARE

## 2023-02-16 VITALS
SYSTOLIC BLOOD PRESSURE: 128 MMHG | HEIGHT: 72 IN | BODY MASS INDEX: 37.11 KG/M2 | TEMPERATURE: 98.4 F | RESPIRATION RATE: 18 BRPM | OXYGEN SATURATION: 98 % | DIASTOLIC BLOOD PRESSURE: 74 MMHG | HEART RATE: 64 BPM | WEIGHT: 274 LBS

## 2023-02-16 DIAGNOSIS — R73.9 HYPERGLYCEMIA: ICD-10-CM

## 2023-02-16 DIAGNOSIS — E78.2 MIXED HYPERLIPIDEMIA: Primary | ICD-10-CM

## 2023-02-16 DIAGNOSIS — R74.01 TRANSAMINITIS: ICD-10-CM

## 2023-02-16 PROCEDURE — 99214 OFFICE O/P EST MOD 30 MIN: CPT | Performed by: INTERNAL MEDICINE

## 2023-02-16 NOTE — PROGRESS NOTES
"Chief Complaint  Anxiety (Follow up )    Subjective        Davi Kendall presents to [facility]  History of Present Illness  Here for follow up of seizure disorder. Has been adherent to medication regimen. Last seizure was in 2017, sees Neuro annually.       Objective   Vital Signs:  /74   Pulse 64   Temp 98.4 °F (36.9 °C)   Resp 18   Ht 182.9 cm (72\")   Wt 124 kg (274 lb)   SpO2 98%   BMI 37.16 kg/m²   Estimated body mass index is 37.16 kg/m² as calculated from the following:    Height as of this encounter: 182.9 cm (72\").    Weight as of this encounter: 124 kg (274 lb).           Physical Exam  Vitals and nursing note reviewed.   Constitutional:       General: He is not in acute distress.     Appearance: Normal appearance. He is obese.   HENT:      Head: Normocephalic and atraumatic.      Right Ear: External ear normal.      Left Ear: External ear normal.      Nose: Nose normal.      Mouth/Throat:      Mouth: Mucous membranes are moist.      Pharynx: Oropharynx is clear.   Eyes:      Extraocular Movements: Extraocular movements intact.      Conjunctiva/sclera: Conjunctivae normal.      Pupils: Pupils are equal, round, and reactive to light.   Cardiovascular:      Rate and Rhythm: Normal rate and regular rhythm.      Pulses: Normal pulses.      Heart sounds: Normal heart sounds. No murmur heard.    No gallop.   Pulmonary:      Effort: Pulmonary effort is normal.      Breath sounds: Normal breath sounds.   Abdominal:      General: Abdomen is flat. Bowel sounds are normal. There is no distension.      Palpations: Abdomen is soft. There is no mass.      Tenderness: There is no abdominal tenderness.   Musculoskeletal:         General: No swelling. Normal range of motion.      Cervical back: Normal range of motion and neck supple.   Skin:     General: Skin is warm and dry.      Findings: No rash.   Neurological:      General: No focal deficit present.      " Mental Status: He is alert and oriented to person, place, and time. Mental status is at baseline.   Psychiatric:         Mood and Affect: Mood normal.         Behavior: Behavior normal.        Result Review :  The following data was reviewed by: Lionel Guzman, Medical Student on 02/16/2023:  Common labs    Common Labs 8/16/22 8/16/22 8/16/22 8/16/22    1155 1158 1158 1158   Glucose 92      BUN 9      Creatinine 0.96      Sodium 141      Potassium 4.2      Chloride 105      Calcium 9.4      Total Protein 7.6      Albumin 4.9      Total Bilirubin 0.4      Alkaline Phosphatase 68      AST (SGOT) 40      ALT (SGPT) 71 (A)      WBC    6.7   Hemoglobin    15.9   Hematocrit    46.5   Platelets    245   Total Cholesterol   197    Triglycerides   276 (A)    HDL Cholesterol   34 (A)    LDL Cholesterol    115 (A)    Hemoglobin A1C  5.3     (A) Abnormal value       Comments are available for some flowsheets but are not being displayed.                        Assessment and Plan   Diagnoses and all orders for this visit:    1. Mixed hyperlipidemia (Primary)  -     Comprehensive Metabolic Panel  -     Lipid Panel With / Chol / HDL Ratio  -     Hemoglobin A1c    2. Hyperglycemia  -     Comprehensive Metabolic Panel  -     Lipid Panel With / Chol / HDL Ratio  -     Hemoglobin A1c    - Repeat CBC, CMP, Lipid panel, A1c today       Follow Up   No follow-ups on file.  Patient was given instructions and counseling regarding his condition or for health maintenance advice. Please see specific information pulled into the AVS if appropriate.

## 2023-02-17 LAB
ALBUMIN SERPL-MCNC: 5.2 G/DL (ref 3.5–5.2)
ALBUMIN/GLOB SERPL: 1.9 G/DL
ALP SERPL-CCNC: 72 U/L (ref 39–117)
ALT SERPL-CCNC: 67 U/L (ref 1–41)
AST SERPL-CCNC: 32 U/L (ref 1–40)
BILIRUB SERPL-MCNC: 0.5 MG/DL (ref 0–1.2)
BUN SERPL-MCNC: 9 MG/DL (ref 6–20)
BUN/CREAT SERPL: 9.1 (ref 7–25)
CALCIUM SERPL-MCNC: 9.5 MG/DL (ref 8.6–10.5)
CHLORIDE SERPL-SCNC: 104 MMOL/L (ref 98–107)
CHOLEST SERPL-MCNC: 215 MG/DL (ref 0–200)
CHOLEST/HDLC SERPL: 5.51 {RATIO}
CO2 SERPL-SCNC: 24.1 MMOL/L (ref 22–29)
CREAT SERPL-MCNC: 0.99 MG/DL (ref 0.76–1.27)
EGFRCR SERPLBLD CKD-EPI 2021: 103.8 ML/MIN/1.73
GLOBULIN SER CALC-MCNC: 2.7 GM/DL
GLUCOSE SERPL-MCNC: 80 MG/DL (ref 65–99)
HBA1C MFR BLD: 5 % (ref 4.8–5.6)
HDLC SERPL-MCNC: 39 MG/DL (ref 40–60)
LDLC SERPL CALC-MCNC: 130 MG/DL (ref 0–100)
POTASSIUM SERPL-SCNC: 4.3 MMOL/L (ref 3.5–5.2)
PROT SERPL-MCNC: 7.9 G/DL (ref 6–8.5)
SODIUM SERPL-SCNC: 139 MMOL/L (ref 136–145)
TRIGL SERPL-MCNC: 259 MG/DL (ref 0–150)
VLDLC SERPL CALC-MCNC: 46 MG/DL (ref 5–40)

## 2023-03-10 ENCOUNTER — TELEPHONE (OUTPATIENT)
Dept: INTERNAL MEDICINE | Facility: CLINIC | Age: 33
End: 2023-03-10

## 2023-03-10 NOTE — TELEPHONE ENCOUNTER
Caller: SHIV ONEIL    Relationship: Mother    Best call back number: [phone]    Caller requesting test results: MOTHER      What test was performed: LABS    When was the test performed: 2/16/23    Where was the test performed: IN OFFICE     Additional notes: PATIENT'S MOTHER STATES THAT AN ULTRASOUND WAS ORDERED BASED OFF OF RESULTS FROM RECENT LABS. SHE IS REQUESTING A CALLBACK TO DISCUSS THE NEED TO THAT ULTRASOUND. PLEASE ADVISE.

## 2023-05-22 DIAGNOSIS — F32.A ANXIETY AND DEPRESSION: ICD-10-CM

## 2023-05-22 DIAGNOSIS — F41.9 ANXIETY AND DEPRESSION: ICD-10-CM

## 2023-05-22 RX ORDER — VENLAFAXINE HYDROCHLORIDE 75 MG/1
75 CAPSULE, EXTENDED RELEASE ORAL EVERY MORNING
Qty: 90 CAPSULE | Refills: 2 | Status: SHIPPED | OUTPATIENT
Start: 2023-05-22

## 2023-05-22 NOTE — TELEPHONE ENCOUNTER
Caller: SHIV ONEIL    Relationship: Mother    Best call back number: [phone]    Requested Prescriptions:   Requested Prescriptions     Pending Prescriptions Disp Refills   • venlafaxine XR (EFFEXOR-XR) 75 MG 24 hr capsule 90 capsule 2     Sig: Take 1 capsule by mouth Every Morning.        Pharmacy where request should be sent: [id number] [address] AT [facility] [address] - [phone]  - [phone]     Last office visit with prescribing clinician: 2/16/2023   Last telemedicine visit with prescribing clinician: 3/10/2023   Next office visit with prescribing clinician: Visit date not found     Additional details provided by patient: PATIENT HAS 1 TABLET LEFT OF THIS MEDICATION.     Does the patient have less than a 3 day supply:  [x] Yes  [] No    Would you like a call back once the refill request has been completed: [x] Yes [] No    If the office needs to give you a call back, can they leave a voicemail: [x] Yes [] No    Ammon Mac Rep   05/22/23 08:17 EDT

## 2023-05-22 NOTE — TELEPHONE ENCOUNTER
Rx Refill Note  Requested Prescriptions     Pending Prescriptions Disp Refills   • venlafaxine XR (EFFEXOR-XR) 75 MG 24 hr capsule 90 capsule 2     Sig: Take 1 capsule by mouth Every Morning.      Last office visit with prescribing clinician: 2/16/2023   Last telemedicine visit with prescribing clinician: 3/10/2023   Next office visit with prescribing clinician: Visit date not found                         Would you like a call back once the refill request has been completed: [] Yes [] No    If the office needs to give you a call back, can they leave a voicemail: [] Yes [] No    Nadira Pennington MA  05/22/23, 09:18 EDT

## 2023-09-29 ENCOUNTER — OFFICE VISIT (OUTPATIENT)
Dept: INTERNAL MEDICINE | Facility: CLINIC | Age: 33
End: 2023-09-29
Payer: MEDICARE

## 2023-09-29 VITALS
HEIGHT: 72 IN | DIASTOLIC BLOOD PRESSURE: 82 MMHG | HEART RATE: 82 BPM | TEMPERATURE: 98 F | OXYGEN SATURATION: 97 % | BODY MASS INDEX: 37.08 KG/M2 | RESPIRATION RATE: 18 BRPM | WEIGHT: 273.8 LBS | SYSTOLIC BLOOD PRESSURE: 124 MMHG

## 2023-09-29 DIAGNOSIS — F41.9 ANXIETY AND DEPRESSION: ICD-10-CM

## 2023-09-29 DIAGNOSIS — Z00.00 WELL ADULT EXAM: Primary | ICD-10-CM

## 2023-09-29 DIAGNOSIS — F32.A ANXIETY AND DEPRESSION: ICD-10-CM

## 2023-09-29 DIAGNOSIS — R73.01 ELEVATED FASTING GLUCOSE: ICD-10-CM

## 2023-09-29 DIAGNOSIS — E78.2 MIXED HYPERLIPIDEMIA: ICD-10-CM

## 2023-09-29 DIAGNOSIS — G40.909 SEIZURE DISORDER: ICD-10-CM

## 2023-09-29 PROCEDURE — G0439 PPPS, SUBSEQ VISIT: HCPCS | Performed by: INTERNAL MEDICINE

## 2023-09-29 RX ORDER — OLANZAPINE 20 MG/1
20 TABLET ORAL
Qty: 90 TABLET | Refills: 2 | Status: SHIPPED | OUTPATIENT
Start: 2023-09-29 | End: 2023-10-01 | Stop reason: SDUPTHER

## 2023-09-29 RX ORDER — VENLAFAXINE HYDROCHLORIDE 75 MG/1
75 CAPSULE, EXTENDED RELEASE ORAL EVERY MORNING
Qty: 90 CAPSULE | Refills: 2 | Status: SHIPPED | OUTPATIENT
Start: 2023-09-29 | End: 2023-10-01 | Stop reason: SDUPTHER

## 2023-09-29 RX ORDER — LAMOTRIGINE 200 MG/1
200 TABLET ORAL 2 TIMES DAILY
Qty: 180 TABLET | Refills: 2 | Status: SHIPPED | OUTPATIENT
Start: 2023-09-29 | End: 2023-10-01 | Stop reason: SDUPTHER

## 2023-09-29 NOTE — TELEPHONE ENCOUNTER
Rx Refill Note  Requested Prescriptions     Pending Prescriptions Disp Refills    LaMICtal 200 MG tablet 180 tablet 2     Sig: Take 1 tablet by mouth 2 (Two) Times a Day. Do not substitute    OLANZapine (zyPREXA) 20 MG tablet 90 tablet 2     Sig: Take 1 tablet by mouth every night at bedtime.    venlafaxine XR (EFFEXOR-XR) 75 MG 24 hr capsule 90 capsule 2     Sig: Take 1 capsule by mouth Every Morning.      Last office visit with prescribing clinician: 9/29/2023   Last telemedicine visit with prescribing clinician: Visit date not found   Next office visit with prescribing clinician: Visit date not found                         Would you like a call back once the refill request has been completed: [] Yes [] No    If the office needs to give you a call back, can they leave a voicemail: [] Yes [] No    Quincy Jauregui MA  09/29/23, 11:28 EDT

## 2023-09-30 LAB
ALBUMIN SERPL-MCNC: 5.1 G/DL (ref 3.5–5.2)
ALBUMIN/GLOB SERPL: 2 G/DL
ALP SERPL-CCNC: 71 U/L (ref 39–117)
ALT SERPL-CCNC: 72 U/L (ref 1–41)
AST SERPL-CCNC: 40 U/L (ref 1–40)
BASOPHILS # BLD AUTO: 0 10*3/MM3 (ref 0–0.2)
BASOPHILS NFR BLD AUTO: 0 % (ref 0–1.5)
BILIRUB SERPL-MCNC: 0.6 MG/DL (ref 0–1.2)
BUN SERPL-MCNC: 12 MG/DL (ref 6–20)
BUN/CREAT SERPL: 12.2 (ref 7–25)
CALCIUM SERPL-MCNC: 9.6 MG/DL (ref 8.6–10.5)
CHLORIDE SERPL-SCNC: 103 MMOL/L (ref 98–107)
CHOLEST SERPL-MCNC: 210 MG/DL (ref 0–200)
CHOLEST/HDLC SERPL: 5.83 {RATIO}
CO2 SERPL-SCNC: 20.7 MMOL/L (ref 22–29)
CREAT SERPL-MCNC: 0.98 MG/DL (ref 0.76–1.27)
EGFRCR SERPLBLD CKD-EPI 2021: 104.4 ML/MIN/1.73
EOSINOPHIL # BLD AUTO: 0 10*3/MM3 (ref 0–0.4)
EOSINOPHIL NFR BLD AUTO: 0 % (ref 0.3–6.2)
ERYTHROCYTE [DISTWIDTH] IN BLOOD BY AUTOMATED COUNT: 12.5 % (ref 12.3–15.4)
GLOBULIN SER CALC-MCNC: 2.6 GM/DL
GLUCOSE SERPL-MCNC: 82 MG/DL (ref 65–99)
HBA1C MFR BLD: 5 % (ref 4.8–5.6)
HCT VFR BLD AUTO: 46.9 % (ref 37.5–51)
HDLC SERPL-MCNC: 36 MG/DL (ref 40–60)
HGB BLD-MCNC: 16.3 G/DL (ref 13–17.7)
IMM GRANULOCYTES # BLD AUTO: 0.01 10*3/MM3 (ref 0–0.05)
IMM GRANULOCYTES NFR BLD AUTO: 0.2 % (ref 0–0.5)
LDLC SERPL CALC-MCNC: 117 MG/DL (ref 0–100)
LYMPHOCYTES # BLD AUTO: 1.55 10*3/MM3 (ref 0.7–3.1)
LYMPHOCYTES NFR BLD AUTO: 24.7 % (ref 19.6–45.3)
MCH RBC QN AUTO: 31.5 PG (ref 26.6–33)
MCHC RBC AUTO-ENTMCNC: 34.8 G/DL (ref 31.5–35.7)
MCV RBC AUTO: 90.7 FL (ref 79–97)
MONOCYTES # BLD AUTO: 0.82 10*3/MM3 (ref 0.1–0.9)
MONOCYTES NFR BLD AUTO: 13.1 % (ref 5–12)
NEUTROPHILS # BLD AUTO: 3.89 10*3/MM3 (ref 1.7–7)
NEUTROPHILS NFR BLD AUTO: 62 % (ref 42.7–76)
NRBC BLD AUTO-RTO: 0 /100 WBC (ref 0–0.2)
PLATELET # BLD AUTO: 223 10*3/MM3 (ref 140–450)
POTASSIUM SERPL-SCNC: 4.1 MMOL/L (ref 3.5–5.2)
PROT SERPL-MCNC: 7.7 G/DL (ref 6–8.5)
RBC # BLD AUTO: 5.17 10*6/MM3 (ref 4.14–5.8)
SODIUM SERPL-SCNC: 137 MMOL/L (ref 136–145)
TRIGL SERPL-MCNC: 327 MG/DL (ref 0–150)
TSH SERPL DL<=0.005 MIU/L-ACNC: 1.5 UIU/ML (ref 0.27–4.2)
VLDLC SERPL CALC-MCNC: 57 MG/DL (ref 5–40)
WBC # BLD AUTO: 6.27 10*3/MM3 (ref 3.4–10.8)

## 2023-10-01 RX ORDER — OLANZAPINE 20 MG/1
20 TABLET ORAL
Qty: 90 TABLET | Refills: 2 | Status: SHIPPED | OUTPATIENT
Start: 2023-10-01

## 2023-10-01 RX ORDER — VENLAFAXINE HYDROCHLORIDE 75 MG/1
75 CAPSULE, EXTENDED RELEASE ORAL EVERY MORNING
Qty: 90 CAPSULE | Refills: 2 | Status: SHIPPED | OUTPATIENT
Start: 2023-10-01

## 2023-10-01 RX ORDER — LAMOTRIGINE 200 MG/1
200 TABLET ORAL 2 TIMES DAILY
Qty: 180 TABLET | Refills: 2 | Status: SHIPPED | OUTPATIENT
Start: 2023-10-01

## 2023-10-02 NOTE — PROGRESS NOTES
The ABCs of the Annual Wellness Visit  Subsequent Medicare Wellness Visit    Subjective      Davi Kendall is a 33 y.o. male who presents for a Subsequent Medicare Wellness Visit.    The following portions of the patient's history were reviewed and   updated as appropriate: allergies, current medications, past family history, past medical history, past social history, past surgical history, and problem list.    Compared to one year ago, the patient feels his physical   health is the same.    Compared to one year ago, the patient feels his mental   health is the same.    Recent Hospitalizations:  He was not admitted to the hospital during the last year.       Current Medical Providers:  Patient Care Team:  Girish Ibarra MD as PCP - General (Internal Medicine)    Outpatient Medications Prior to Visit   Medication Sig Dispense Refill    fluticasone (FLONASE) 50 MCG/ACT nasal spray 2 sprays into the nostril(s) as directed by provider Daily. 16 g 0    LaMICtal 200 MG tablet Take 1 tablet by mouth 2 (Two) Times a Day. Do not substitute 180 tablet 2    OLANZapine (zyPREXA) 20 MG tablet TAKE 1 TABLET BY MOUTH EVERY NIGHT AT BEDTIME 90 tablet 2    venlafaxine XR (EFFEXOR-XR) 75 MG 24 hr capsule Take 1 capsule by mouth Every Morning. 90 capsule 2     No facility-administered medications prior to visit.       No opioid medication identified on active medication list. I have reviewed chart for other potential  high risk medication/s and harmful drug interactions in the elderly.        Aspirin is not on active medication list.  Aspirin use is not indicated based on review of current medical condition/s. Risk of harm outweighs potential benefits.  .    Patient Active Problem List   Diagnosis    Anxiety and depression    Seizure disorder     Advance Care Planning   Advance Care Planning     Advance Directive is on file.  ACP discussion was held with the patient during this visit. Patient has an advance directive in EMR  "which is still valid.      Objective    Vitals:    09/29/23 1123   BP: 124/82   Pulse: 82   Resp: 18   Temp: 98 °F (36.7 °C)   SpO2: 97%   Weight: 124 kg (273 lb 12.8 oz)   Height: 182.9 cm (72\")     Estimated body mass index is 37.13 kg/m² as calculated from the following:    Height as of this encounter: 182.9 cm (72\").    Weight as of this encounter: 124 kg (273 lb 12.8 oz).    Class 2 Severe Obesity (BMI >=35 and <=39.9). Obesity-related health conditions include the following: none. Obesity is unchanged. BMI is is above average; BMI management plan is completed. We discussed portion control and increasing exercise.      Does the patient have evidence of cognitive impairment?   Yes: Referral to neurology ordered.    Lab Results   Component Value Date    CHLPL 210 (H) 09/29/2023    TRIG 327 (H) 09/29/2023    HDL 36 (L) 09/29/2023     (H) 09/29/2023    VLDL 57 (H) 09/29/2023    HGBA1C 5.00 09/29/2023          HEALTH RISK ASSESSMENT    Smoking Status:  Social History     Tobacco Use   Smoking Status Former   Smokeless Tobacco Never     Alcohol Consumption:  Social History     Substance and Sexual Activity   Alcohol Use No     Fall Risk Screen:    KAYY Fall Risk Assessment has not been completed.    Depression Screening:       No data to display                Health Habits and Functional and Cognitive Screening:       No data to display                Age-appropriate Screening Schedule:  Refer to the list below for future screening recommendations based on patient's age, sex and/or medical conditions. Orders for these recommended tests are listed in the plan section. The patient has been provided with a written plan.    Health Maintenance   Topic Date Due    TDAP/TD VACCINES (2 - Td or Tdap) 07/15/2023    BMI FOLLOWUP  08/16/2023    COVID-19 Vaccine (5 - 2023-24 season) 09/01/2023    ANNUAL WELLNESS VISIT  09/29/2024    LIPID PANEL  09/29/2024    HEPATITIS C SCREENING  Completed    INFLUENZA VACCINE  " Completed    Pneumococcal Vaccine 0-64  Aged Out                  CMS Preventative Services Quick Reference  Risk Factors Identified During Encounter:    None Identified    The above risks/problems have been discussed with the patient.  Pertinent information has been shared with the patient in the After Visit Summary.    Diagnoses and all orders for this visit:    1. Well adult exam (Primary)  -     CBC & Differential  -     TSH    2. Mixed hyperlipidemia  -     Lipid Panel With / Chol / HDL Ratio    3. Elevated fasting glucose  -     Comprehensive Metabolic Panel  -     Lipid Panel With / Chol / HDL Ratio  -     Hemoglobin A1c    4. Seizure disorder    5. Anxiety and depression  -     OLANZapine (zyPREXA) 20 MG tablet; Take 1 tablet by mouth every night at bedtime.  Dispense: 90 tablet; Refill: 2  -     LaMICtal 200 MG tablet; Take 1 tablet by mouth 2 (Two) Times a Day. Do not substitute  Dispense: 180 tablet; Refill: 2  -     venlafaxine XR (EFFEXOR-XR) 75 MG 24 hr capsule; Take 1 capsule by mouth Every Morning.  Dispense: 90 capsule; Refill: 2        Follow Up:   Next Medicare Wellness visit to be scheduled in 1 year.      An After Visit Summary and PPPS were made available to the patient.

## 2023-10-02 NOTE — PROGRESS NOTES
"Chief Complaint   Patient presents with    Med Refill       Subjective   Davi Kendall is a 33 y.o. male.     History of Present Illness  Well adult, doing well without issues     The following portions of the patient's history were reviewed and updated as appropriate: allergies, current medications, past family history, past medical history, past social history, past surgical history, and problem list.    Review of Systems      Objective   Body mass index is 37.13 kg/m².   Vitals:    09/29/23 1123   BP: 124/82   Pulse: 82   Resp: 18   Temp: 98 °F (36.7 °C)   SpO2: 97%   Weight: 124 kg (273 lb 12.8 oz)   Height: 182.9 cm (72\")        Physical Exam  Vitals and nursing note reviewed.   Constitutional:       General: He is not in acute distress.     Appearance: Normal appearance.   HENT:      Head: Normocephalic and atraumatic.      Right Ear: External ear normal.      Left Ear: External ear normal.      Nose: Nose normal.      Mouth/Throat:      Mouth: Mucous membranes are moist.      Pharynx: Oropharynx is clear.   Eyes:      Extraocular Movements: Extraocular movements intact.      Conjunctiva/sclera: Conjunctivae normal.      Pupils: Pupils are equal, round, and reactive to light.   Cardiovascular:      Rate and Rhythm: Normal rate and regular rhythm.      Pulses: Normal pulses.      Heart sounds: Normal heart sounds. No murmur heard.    No gallop.   Pulmonary:      Effort: Pulmonary effort is normal.      Breath sounds: Normal breath sounds.   Abdominal:      General: Abdomen is flat. Bowel sounds are normal. There is no distension.      Palpations: Abdomen is soft. There is no mass.      Tenderness: There is no abdominal tenderness.   Musculoskeletal:         General: No swelling. Normal range of motion.      Cervical back: Normal range of motion and neck supple.   Skin:     General: Skin is warm and dry.      Findings: No rash.   Neurological:      General: No focal deficit present.      Mental Status: He is " alert and oriented to person, place, and time. Mental status is at baseline.   Psychiatric:         Mood and Affect: Mood normal.         Behavior: Behavior normal.         Current Outpatient Medications:     fluticasone (FLONASE) 50 MCG/ACT nasal spray, 2 sprays into the nostril(s) as directed by provider Daily., Disp: 16 g, Rfl: 0    LaMICtal 200 MG tablet, Take 1 tablet by mouth 2 (Two) Times a Day. Do not substitute, Disp: 180 tablet, Rfl: 2    OLANZapine (zyPREXA) 20 MG tablet, Take 1 tablet by mouth every night at bedtime., Disp: 90 tablet, Rfl: 2    venlafaxine XR (EFFEXOR-XR) 75 MG 24 hr capsule, Take 1 capsule by mouth Every Morning., Disp: 90 capsule, Rfl: 2     Lab Results   Component Value Date    HGBA1C 5.00 09/29/2023    TSH 1.500 09/29/2023        Health Maintenance   Topic Date Due    TDAP/TD VACCINES (2 - Td or Tdap) 07/15/2023    BMI FOLLOWUP  08/16/2023    COVID-19 Vaccine (5 - 2023-24 season) 09/01/2023    ANNUAL WELLNESS VISIT  09/29/2024    LIPID PANEL  09/29/2024    HEPATITIS C SCREENING  Completed    INFLUENZA VACCINE  Completed    Pneumococcal Vaccine 0-64  Aged Out        Immunization History   Administered Date(s) Administered    COVID-19 (PFIZER) Purple Cap Monovalent 02/27/2021, 03/20/2021, 09/04/2021       Assessment & Plan   Diagnoses and all orders for this visit:    1. Well adult exam (Primary)  -     CBC & Differential  -     TSH    2. Mixed hyperlipidemia  -     Lipid Panel With / Chol / HDL Ratio    3. Elevated fasting glucose  -     Comprehensive Metabolic Panel  -     Lipid Panel With / Chol / HDL Ratio  -     Hemoglobin A1c    4. Seizure disorder           Well adult exam  - labs checked and evaluated    Colonoscopy - at 45  Prostate - at 50  Glaucoma - yearly  AAA - na  Lung cancer - na  HIV - neg  HCV - neg  DM - checking  HLD - checking  Smoking - no  Depression - couseled  Vaccines - counseled  Falls - no  Alcohol Screening - no    Discussed mental health, sexual health,  substance use, abuse, anticipatory guidance given.      No follow-ups on file.     Girish Ibarra MD  [facility]  Internal Medicine and Pediatrics  Phone: [phone]  Fax: [phone]

## 2023-10-17 ENCOUNTER — TELEPHONE (OUTPATIENT)
Dept: INTERNAL MEDICINE | Facility: CLINIC | Age: 33
End: 2023-10-17
Payer: MEDICARE

## 2023-10-17 NOTE — TELEPHONE ENCOUNTER
Left pt's wife  a vm about Dr. Ibarra off-boarding [facility]. Informed them that I can reschedule his appt in March and transfer his care to another provider in the office. Advised them to call back. Hub to read.

## 2024-02-14 ENCOUNTER — TELEPHONE (OUTPATIENT)
Dept: INTERNAL MEDICINE | Facility: CLINIC | Age: 34
End: 2024-02-14
Payer: MEDICARE

## 2024-02-14 DIAGNOSIS — F32.A ANXIETY AND DEPRESSION: ICD-10-CM

## 2024-02-14 DIAGNOSIS — F41.9 ANXIETY AND DEPRESSION: ICD-10-CM

## 2024-02-14 RX ORDER — VENLAFAXINE HYDROCHLORIDE 75 MG/1
75 CAPSULE, EXTENDED RELEASE ORAL EVERY MORNING
Qty: 90 CAPSULE | Refills: 0 | Status: SHIPPED | OUTPATIENT
Start: 2024-02-14

## 2024-04-03 DIAGNOSIS — F41.9 ANXIETY AND DEPRESSION: ICD-10-CM

## 2024-04-03 DIAGNOSIS — F32.A ANXIETY AND DEPRESSION: ICD-10-CM

## 2024-04-03 RX ORDER — LAMOTRIGINE 200 MG/1
200 TABLET ORAL 2 TIMES DAILY
Qty: 20 TABLET | Refills: 0 | Status: SHIPPED | OUTPATIENT
Start: 2024-04-03

## 2024-04-03 RX ORDER — OLANZAPINE 20 MG/1
20 TABLET ORAL
Qty: 10 TABLET | Refills: 0 | Status: SHIPPED | OUTPATIENT
Start: 2024-04-03